# Patient Record
Sex: FEMALE | Race: WHITE | NOT HISPANIC OR LATINO | Employment: FULL TIME | URBAN - METROPOLITAN AREA
[De-identification: names, ages, dates, MRNs, and addresses within clinical notes are randomized per-mention and may not be internally consistent; named-entity substitution may affect disease eponyms.]

---

## 2019-12-22 ENCOUNTER — HOSPITAL ENCOUNTER (EMERGENCY)
Facility: HOSPITAL | Age: 59
Discharge: HOME | End: 2019-12-22
Attending: EMERGENCY MEDICINE | Admitting: EMERGENCY MEDICINE
Payer: COMMERCIAL

## 2019-12-22 ENCOUNTER — APPOINTMENT (EMERGENCY)
Dept: RADIOLOGY | Facility: HOSPITAL | Age: 59
End: 2019-12-22
Payer: COMMERCIAL

## 2019-12-22 VITALS
RESPIRATION RATE: 18 BRPM | OXYGEN SATURATION: 96 % | HEIGHT: 67 IN | SYSTOLIC BLOOD PRESSURE: 125 MMHG | HEART RATE: 90 BPM | TEMPERATURE: 98.8 F | DIASTOLIC BLOOD PRESSURE: 61 MMHG | BODY MASS INDEX: 22.76 KG/M2 | WEIGHT: 145 LBS

## 2019-12-22 DIAGNOSIS — S01.112A EYEBROW LACERATION, LEFT, INITIAL ENCOUNTER: Primary | ICD-10-CM

## 2019-12-22 DIAGNOSIS — S09.90XA INJURY OF HEAD, INITIAL ENCOUNTER: ICD-10-CM

## 2019-12-22 PROCEDURE — 70450 CT HEAD/BRAIN W/O DYE: CPT

## 2019-12-22 PROCEDURE — 12013 RPR F/E/E/N/L/M 2.6-5.0 CM: CPT | Performed by: EMERGENCY MEDICINE

## 2019-12-22 PROCEDURE — 99283 EMERGENCY DEPT VISIT LOW MDM: CPT

## 2019-12-22 PROCEDURE — 90471 IMMUNIZATION ADMIN: CPT

## 2019-12-22 PROCEDURE — 90715 TDAP VACCINE 7 YRS/> IM: CPT | Performed by: EMERGENCY MEDICINE

## 2019-12-22 PROCEDURE — 99284 EMERGENCY DEPT VISIT MOD MDM: CPT | Performed by: EMERGENCY MEDICINE

## 2019-12-22 RX ORDER — GINSENG 100 MG
1 CAPSULE ORAL ONCE
Status: COMPLETED | OUTPATIENT
Start: 2019-12-22 | End: 2019-12-22

## 2019-12-22 RX ORDER — LIDOCAINE HYDROCHLORIDE AND EPINEPHRINE 10; 10 MG/ML; UG/ML
10 INJECTION, SOLUTION INFILTRATION; PERINEURAL ONCE
Status: COMPLETED | OUTPATIENT
Start: 2019-12-22 | End: 2019-12-22

## 2019-12-22 RX ADMIN — TETANUS TOXOID, REDUCED DIPHTHERIA TOXOID AND ACELLULAR PERTUSSIS VACCINE, ADSORBED 0.5 ML: 5; 2.5; 8; 8; 2.5 SUSPENSION INTRAMUSCULAR at 22:34

## 2019-12-22 RX ADMIN — LIDOCAINE HYDROCHLORIDE AND EPINEPHRINE 10 ML: 10; 10 INJECTION, SOLUTION INFILTRATION; PERINEURAL at 21:40

## 2019-12-22 RX ADMIN — BACITRACIN 1 SMALL APPLICATION: 500 OINTMENT TOPICAL at 22:33

## 2019-12-23 NOTE — ED PROVIDER NOTES
History  Chief Complaint   Patient presents with   Cleopatra Seller Fall     pt was out with family drinking, pt was found in mens room bathroom on floor awake with a roughly 2cm lac above left eye  pt does not recall the events of falling  HPI  63 yo female presents to the ED with laceration to her left eyebrow s/p unwitnessed fall this evening  Pt reports she had multiple (>6) alcoholic drinks throughout the day today and this evening was in the bathroom at a bar when a family member standing outside heard a loud noise  Pt opened the door of the bathroom herself and family member found her awake but lying of the floor  Pt is unsure how she fell or what she hit her head on  She notes some tenderness and swelling over her left brow bone, but denies significant headache  No neck pain  No dizziness, nausea, vomiting, visual changes, numbness, weakness, difficulty walking  No joint pain or other injuries sustained in the fall  Family member confirms that pt is intoxicated but not significantly altered  Pt does not take any blood thinners  She denies any medical problems or any daily medications  Pt has not had tetanus immunization in last 10 years  None       History reviewed  No pertinent past medical history  History reviewed  No pertinent surgical history  History reviewed  No pertinent family history  I have reviewed and agree with the history as documented  Social History     Tobacco Use    Smoking status: Never Smoker    Smokeless tobacco: Never Used   Substance Use Topics    Alcohol use: Yes    Drug use: Never        Review of Systems   Constitutional: Negative for chills and fever  HENT: Negative for congestion, rhinorrhea and sore throat  Eyes: Negative for visual disturbance  Respiratory: Negative for chest tightness and shortness of breath  Cardiovascular: Negative for chest pain  Gastrointestinal: Negative for abdominal pain, diarrhea, nausea and vomiting     Genitourinary: Negative for dysuria and hematuria  Musculoskeletal: Positive for arthralgias and myalgias  Skin: Positive for color change (bruising to face) and wound (eyebrow laceration)  Negative for rash  Neurological: Positive for headaches  Negative for dizziness, syncope, weakness, light-headedness and numbness  All other systems reviewed and are negative  Physical Exam  ED Triage Vitals [12/22/19 2025]   Temperature Pulse Respirations Blood Pressure SpO2   98 8 °F (37 1 °C) 87 20 145/80 96 %      Temp Source Heart Rate Source Patient Position - Orthostatic VS BP Location FiO2 (%)   Oral Monitor Lying Right arm --      Pain Score       No Pain             Orthostatic Vital Signs  Vitals:    12/22/19 2025 12/22/19 2218   BP: 145/80 125/61   Pulse: 87 90   Patient Position - Orthostatic VS: Lying Lying       Physical Exam   Constitutional: She is oriented to person, place, and time  She appears well-developed and well-nourished  No distress  HENT:   Head: Normocephalic  Head is with laceration (3cm left eyebrow)  Right Ear: External ear normal    Left Ear: External ear normal    Nose: Nose normal    Mild edema and ecchymosis and tenderness over left brow bone    Eyes: Pupils are equal, round, and reactive to light  Conjunctivae and EOM are normal    Neck: Normal range of motion  Neck supple  Cardiovascular: Normal rate, regular rhythm, normal heart sounds and intact distal pulses  Exam reveals no gallop and no friction rub  No murmur heard  Pulmonary/Chest: Effort normal and breath sounds normal  No respiratory distress  She has no wheezes  She has no rhonchi  She has no rales  Abdominal: Soft  Bowel sounds are normal  She exhibits no distension and no mass  There is no tenderness  There is no rebound and no guarding  Musculoskeletal: Normal range of motion  Lymphadenopathy:     She has no cervical adenopathy  Neurological: She is alert and oriented to person, place, and time  She has normal strength   No cranial nerve deficit or sensory deficit  Skin: Skin is warm and dry  She is not diaphoretic  Psychiatric: She has a normal mood and affect  Nursing note and vitals reviewed  ED Medications  Medications   lidocaine-epinephrine (XYLOCAINE/EPINEPHRINE) 1 %-1:100,000 injection 10 mL (10 mL Infiltration Given 12/22/19 2140)   tetanus-diphtheria-acellular pertussis (BOOSTRIX) IM injection 0 5 mL (0 5 mL Intramuscular Given 12/22/19 2234)   bacitracin topical ointment 1 small application (1 small application Topical Given 12/22/19 2233)       Diagnostic Studies  Results Reviewed     None                 CT head without contrast   Final Result by Flavia Negron DO (12/22 2141)      No acute intracranial abnormality  Left periorbital soft tissue swelling/laceration  No calvarial fracture  Workstation performed: [id number]               Procedures  Laceration repair  Date/Time: 12/22/2019 10:32 PM  Performed by: Trupti Black MD  Authorized by: Trupti Black MD   Consent: Verbal consent obtained  Risks and benefits: risks, benefits and alternatives were discussed  Consent given by: patient  Patient understanding: patient states understanding of the procedure being performed  Patient identity confirmed: verbally with patient  Body area: head/neck  Location details: left eyebrow  Laceration length: 3 cm  Foreign bodies: no foreign bodies  Tendon involvement: none  Nerve involvement: none  Vascular damage: no  Anesthesia: local infiltration    Anesthesia:  Local Anesthetic: lidocaine 1% with epinephrine  Anesthetic total: 4 mL    Sedation:  Patient sedated: no      Wound Dehiscence:  Superficial Wound Dehiscence: simple closure      Procedure Details:  Preparation: Patient was prepped and draped in the usual sterile fashion    Irrigation solution: tap water  Irrigation method: syringe  Amount of cleaning: standard  Debridement: none  Degree of undermining: none  Skin closure: 6-0 nylon  Number of sutures: 7  Technique: simple  Approximation: close  Approximation difficulty: simple  Dressing: antibiotic ointment  Patient tolerance: Patient tolerated the procedure well with no immediate complications            ED Course  ED Course as of Dec 22 2340   Sun Dec 22, 2019   2144 No intracranial injury or facial fracture   CT head without contrast                               [facility]  Number of Diagnoses or Management Options  Eyebrow laceration, left, initial encounter:   Injury of head, initial encounter:     61 yo female with left facial laceration s/p head injury today  Will get CT to rule out intracranial injury or facial fracture  Will irrigate and repair laceration  Will update tetanus  Likely discharge  Disposition  Final diagnoses:   Eyebrow laceration, left, initial encounter   Injury of head, initial encounter     Time reflects when diagnosis was documented in both MDM as applicable and the Disposition within this note     Time User Action Codes Description Comment    12/22/2019 10:21 PM Licha Cha Add [S85 [id number]] Eyebrow laceration, left, initial encounter     12/22/2019 10:21 PM Licha Cha Add [[id number]] Injury of head, initial encounter       ED Disposition     ED Disposition Condition Date/Time Comment    Discharge Stable Sun Dec 22, 2019 10:21 PM Batsheva Gregg discharge to home/self care  Follow-up Information     Follow up With Specialties Details Why Contact Info Additional [address] Emergency Department Emergency Medicine In 5 days or your PCP for suture removal [address] [facility] ED, [address] [place] [address], [id number]   [phone]          There are no discharge medications for this patient  No discharge procedures on file  ED Provider  Attending physically available and evaluated Batsheva Gregg I managed the patient along with the ED Attending      Electronically Signed by         Bill Tate MD  12/22/19 1150

## 2019-12-29 NOTE — ED ATTENDING ATTESTATION
12/22/2019  ISean DO, saw and evaluated the patient  I have discussed the patient with the resident/non-physician practitioner and agree with the resident's/non-physician practitioner's findings, Plan of Care, and MDM as documented in the resident's/non-physician practitioner's note, except where noted  All available labs and Radiology studies were reviewed  I was present for key portions of any procedure(s) performed by the resident/non-physician practitioner and I was immediately available to provide assistance  At this point I agree with the current assessment done in the Emergency Department  I have conducted an independent evaluation of this patient a history and physical is as follows:    62 yof , fall while intoxcated  Struck front of head  Here with sister  Left eyebrow laceration  Plan ct head, tetanus update, primary repair with suture      ED Course         Critical Care Time  Procedures